# Patient Record
Sex: MALE | Race: WHITE | NOT HISPANIC OR LATINO | Employment: FULL TIME | URBAN - METROPOLITAN AREA
[De-identification: names, ages, dates, MRNs, and addresses within clinical notes are randomized per-mention and may not be internally consistent; named-entity substitution may affect disease eponyms.]

---

## 2024-10-31 ENCOUNTER — OFFICE VISIT (OUTPATIENT)
Dept: GASTROENTEROLOGY | Facility: CLINIC | Age: 29
End: 2024-10-31
Payer: COMMERCIAL

## 2024-10-31 VITALS — WEIGHT: 247.56 LBS | HEIGHT: 71 IN | BODY MASS INDEX: 34.66 KG/M2

## 2024-10-31 DIAGNOSIS — R14.0 ABDOMINAL BLOATING: ICD-10-CM

## 2024-10-31 DIAGNOSIS — R11.2 NAUSEA AND VOMITING, UNSPECIFIED VOMITING TYPE: ICD-10-CM

## 2024-10-31 DIAGNOSIS — K62.5 RECTAL BLEEDING: Primary | ICD-10-CM

## 2024-10-31 DIAGNOSIS — K62.89 RECTAL PAIN: ICD-10-CM

## 2024-10-31 DIAGNOSIS — R19.8 IRREGULAR BOWEL HABITS: ICD-10-CM

## 2024-10-31 DIAGNOSIS — R19.8 ALTERNATING CONSTIPATION AND DIARRHEA: ICD-10-CM

## 2024-10-31 DIAGNOSIS — R12 HEARTBURN: ICD-10-CM

## 2024-10-31 PROCEDURE — 99999 PR PBB SHADOW E&M-EST. PATIENT-LVL III: CPT | Mod: PBBFAC,,,

## 2024-10-31 PROCEDURE — 1160F RVW MEDS BY RX/DR IN RCRD: CPT | Mod: CPTII,S$GLB,,

## 2024-10-31 PROCEDURE — 1159F MED LIST DOCD IN RCRD: CPT | Mod: CPTII,S$GLB,,

## 2024-10-31 PROCEDURE — 3008F BODY MASS INDEX DOCD: CPT | Mod: CPTII,S$GLB,,

## 2024-10-31 PROCEDURE — 99204 OFFICE O/P NEW MOD 45 MIN: CPT | Mod: S$GLB,,,

## 2024-10-31 RX ORDER — ONDANSETRON 4 MG/1
4 TABLET, ORALLY DISINTEGRATING ORAL EVERY 12 HOURS PRN
Qty: 30 TABLET | Refills: 0 | Status: SHIPPED | OUTPATIENT
Start: 2024-10-31

## 2024-10-31 RX ORDER — OMEPRAZOLE 40 MG/1
40 CAPSULE, DELAYED RELEASE ORAL EVERY MORNING
Qty: 90 CAPSULE | Refills: 0 | Status: SHIPPED | OUTPATIENT
Start: 2024-10-31 | End: 2025-01-29

## 2025-01-07 ENCOUNTER — TELEPHONE (OUTPATIENT)
Dept: GASTROENTEROLOGY | Facility: CLINIC | Age: 30
End: 2025-01-07
Payer: COMMERCIAL

## 2025-01-07 NOTE — TELEPHONE ENCOUNTER
----- Message from Jenn sent at 1/7/2025 12:11 PM CST -----  Type: Needs Medical Advice  Who Called:  pt wife   Symptoms (please be specific):  having flu like symptoms needs to reschedule surgery.  How long has patient had these symptoms:  1 day  Pharmacy name and phone #:    Best Call Back Number: [phone]  Additional Information: Needs to reschedule  Please call back to advise. Thanks!

## 2025-02-06 ENCOUNTER — TELEPHONE (OUTPATIENT)
Dept: GASTROENTEROLOGY | Facility: CLINIC | Age: 30
End: 2025-02-06
Payer: COMMERCIAL